# Patient Record
Sex: FEMALE
[De-identification: names, ages, dates, MRNs, and addresses within clinical notes are randomized per-mention and may not be internally consistent; named-entity substitution may affect disease eponyms.]

---

## 2019-09-18 ENCOUNTER — RESULT REVIEW (OUTPATIENT)
Age: 54
End: 2019-09-18

## 2020-10-19 ENCOUNTER — RESULT REVIEW (OUTPATIENT)
Age: 55
End: 2020-10-19

## 2021-06-29 ENCOUNTER — RESULT REVIEW (OUTPATIENT)
Age: 56
End: 2021-06-29

## 2021-10-25 ENCOUNTER — RESULT REVIEW (OUTPATIENT)
Age: 56
End: 2021-10-25

## 2022-12-07 PROBLEM — Z00.00 ENCOUNTER FOR PREVENTIVE HEALTH EXAMINATION: Status: ACTIVE | Noted: 2022-12-07

## 2022-12-13 ENCOUNTER — APPOINTMENT (OUTPATIENT)
Dept: HEMATOLOGY ONCOLOGY | Facility: CLINIC | Age: 57
End: 2022-12-13

## 2022-12-28 ENCOUNTER — OUTPATIENT (OUTPATIENT)
Dept: OUTPATIENT SERVICES | Facility: HOSPITAL | Age: 57
LOS: 1 days | Discharge: HOME | End: 2022-12-28

## 2022-12-28 DIAGNOSIS — D64.9 ANEMIA, UNSPECIFIED: ICD-10-CM

## 2023-01-04 ENCOUNTER — APPOINTMENT (OUTPATIENT)
Dept: HEMATOLOGY ONCOLOGY | Facility: CLINIC | Age: 58
End: 2023-01-04

## 2023-01-20 NOTE — DISCUSSION/SUMMARY
[FreeTextEntry1] : The visit was provided via telehealth using real-time 2-way audio visual technology. The patient, Rosie Melgar, [place] [address] at the time of the visit. The provider, Subha Mesa, was located at the medical office located in [place], [place] at the time of the visit. The patient, Rosie Melgar, and Provider, Subha Mesa, participated in the telehealth encounter. Consent for telehealth services was given on 2023 by the patient, Rosie Melgar.\par \par REASON FOR CONSULT\par Rosie Melgar is a 57-year-old female who was referred by Dr. Radha Lind for cancer genetic counseling and a discussion regarding the option for updated genetic testing as well as a discussion of her variant of uncertain significance (VUS) genetic testing results related to hereditary cancer predisposition. \par \par RELEVANT MEDICAL HISTORY\par Ms. Melgar is a healthy individual who has never had cancer. She has a family history of cancer. Relevant reported diagnoses include: her sister with breast cancer (dx early 50s), her mother with breast cancer (dx early 70s); Additional diagnoses as noted in pedigree, see below. \par \par Ms. Melgar previously pursued genetic testing of the BRCA1 and BRCA2 genes, and a variant of uncertain significance (VUS) was detected in the BRCA1 gene (c. 80+20T>C). This test was ordered by Yahaira Kwok and reported on 2015.\par \par OTHER MEDICAL AND SURGICAL HISTORY:\par Medical: back pain (possible nerve issue), uterine fibroids (approx. 3 total per patient)\par Surgical: partial hysterectomy early 40s (fibroids and painful cramps), foot surgery bunion x2, appendectomy in childhood\par \par PAST OB/GYN HISTORY:\par Obstetrical History: \par Age at Menarche: [age]\par Menopausal with LMP at age early 40s\par Age at First Live Birth: 29\par Oral Contraceptive Use: 8 years total\par Hormone Replacement Therapy: No\par \par CANCER SCREENING HISTORY:  \par Breast: \par •	Most recent mammo/sono: 2022; Results: reported monitoring cysts; Frequency: yearly\par •	No prior breast MRI\par •	Breast Biopsies: , bilateral, R-fibroadenoma, L-fibroadenoma\par GYN:\par •	Most recent GYN visit: ; Frequency: pre-pandemic \par •	Most recent pap smear: ; Results: reported normal\par •	No prior transvaginal ultrasound per pt\par Colon:\par •	Most recent colonoscopy: 10/2021; Results: reported 0 polyps; Frequency: 5 years\par •	Total Polyps: 1 total per pt\par Skin:  \par •	No recent skin exam, no lesions removed, saw dermatologist due to cosmetic indication at 55yo\par \par SOCIAL HISTORY:\par •	Tobacco-product use: Never smoker\par •	Environmental exposure: None\par \par FAMILY HISTORY:\par Maternal and paternal ancestry was reported as . No Ashkenazi Zoroastrian heritage reported. Consanguinity was denied. A detailed family history of cancer was ascertained, see below and scanned chart for pedigree. \par \par Of note, Ms. Melgar reported that her sister underwent genetic testing, however, she is not aware of the details of her sister’s results. This relative’s report was unavailable for our review at this time.\par 	\par 	RISK ASSESSMENT:\par Ms. Melgar’s family history of breast cancer in her mother (diagnosed early 70s) and sister (diagnosed early 50s) is not highly suggestive of a hereditary cancer predisposition syndrome. Ms. Melgar previously had genetic testing of the BRCA1 and BRCA2 genes, and NO known disease-causing mutations were identified. Of note, [facility] is currently classifying the previously identified BRCA1 VUS as likely benign in [facility]. \par \par The patient does not meet National Comprehensive Cancer Network (NCCN) criteria for testing. Ms. Melgar would like to pursue genetic testing as she concerned about her family history, and she understands there may be an out-of-pocket cost associated with testing. We discussed the option for updated genetic testing including a Breast Cancer Guidelines-Based Panel and one gene related to uterine fibroids. This test analyzes 12 genes: KHURRAM, BARD1, BRCA1, BRCA2, CDH1, CHEK2, FH, NF1, PALB2, PTEN, STK11, TP53.\par \par The risks, benefits and limitations of genetic testing were discussed with Ms. Melgar. In addition, we discussed the purpose of genetic testing and possible test results (positive, negative, inconclusive) along with associated medical management options and psychosocial implications. Insurance coverage and potential out of pocket costs were also discussed. The Genetic Information Non-discrimination Act (COLIN) was also reviewed, and Ms. Melgar had no concerns at this time.\par \par We also discussed that the laboratory we would utilize to conduct this genetic testing differs from the one previously used. These laboratories may classify mutations, and possibly her BRCA1 VUS, differently. Also, laboratories may utilize differing methods for analysis.\par \par Ms. Melgar verbally consented to the above mentioned genetic testing panel. [facility] informed consent form was emailed to the patient. A saliva sample kit was mailed to the patient today. Once the sample has been collected and sent out, Ms. Melgar will inform us. \par \par PLAN:\par \par 1.	Saliva kit was sent to Ms. Melgar’s home for collection. Once collected and dropped off, patient will inform us. \par 2.	Ms. Melgar was sent a copy of the [facility] consent and medical release form via email to be filled, signed, and returned to us.\par 3.	We will contact Ms. Melgar to schedule a follow-up appointment once the results are available. Results generally return in 2-3 weeks from the day the sample kit is mailed.\par \par \par For any additional questions please call Cancer Genetics at [phone]. \par \par \par Subha Mesa, MS\par Genetic Counselor, Cancer Genetics\par \par \par CC: Patient\par Dr. Radha Lind

## 2023-01-26 ENCOUNTER — APPOINTMENT (OUTPATIENT)
Dept: ORTHOPEDIC SURGERY | Facility: CLINIC | Age: 58
End: 2023-01-26

## 2023-01-31 ENCOUNTER — APPOINTMENT (OUTPATIENT)
Dept: PAIN MANAGEMENT | Facility: CLINIC | Age: 58
End: 2023-01-31
Payer: COMMERCIAL

## 2023-01-31 VITALS — HEIGHT: 65 IN | BODY MASS INDEX: 30.99 KG/M2 | WEIGHT: 186 LBS

## 2023-01-31 PROCEDURE — 99204 OFFICE O/P NEW MOD 45 MIN: CPT

## 2023-01-31 NOTE — PHYSICAL EXAM
[Normal Coordination] : normal coordination [Normal DTR UE/LE] : normal DTR UE/LE  [Normal Sensation] : normal sensation [Normal Mood and Affect] : normal mood and affect [Able to Communicate] : able to communicate [Normal Skin] : normal skin [No Rash] : no rash [No Ulcers] : no ulcers [No Lesions] : no lesions [No obvious lymphadenopathy in areas examined] : no obvious lymphadenopathy in areas examined [Well Developed] : well developed [Well Nourished] : well nourished [No Respiratory Distress] : no respiratory distress [Bending to left] : bending to left [] : motor exam is non-focal throughout both lower extremities

## 2023-01-31 NOTE — CONSULT LETTER
[Dear  ___] : Dear  [unfilled], [Consult Letter:] : I had the pleasure of evaluating your patient, [unfilled]. [Please see my note below.] : Please see my note below. [Consult Closing:] : Thank you very much for allowing me to participate in the care of this patient.  If you have any questions, please do not hesitate to contact me. [Sincerely,] : Sincerely, [FreeTextEntry3] : Tito Byers MD\par

## 2023-01-31 NOTE — DATA REVIEWED
[Outside X-rays] : outside x-rays [Cervical Spine] : cervical spine [Lumbar Spine] : lumbar spine [Report was reviewed and noted in the chart] : The report was reviewed and noted in the chart

## 2023-01-31 NOTE — HISTORY OF PRESENT ILLNESS
[Lower back] : lower back [Left Leg] : left leg [Dull/Aching] : dull/aching [] : yes [Frequent] : frequent [Household chores] : household chores [Social interactions] : social interactions [Rest] : rest [Standing] : standing [Bending forward] : bending forward [Stairs] : stairs [Meds] : meds [FreeTextEntry1] : Back pain with radiation to L hip/ LLE. Pain began early 2022 spring she feels triggered by lifiting heavy things. Denies bowel/bladder symptoms. She had prior episode with spasms in 2021 that resolved after several days of conservative treatment. No prior lumbar epidurals or surgery. Patient had Xrays done Dec 2022 but symptoms still persist despite conservative treatment directed by her PCP Dr KATHY Wade. Xrays reviewed and were deemed normal. No MRI to date will obtain. \par \par Pain is improved with tylenol. Walking improves it. Sitting makes the pain worse.  [de-identified] : lifting

## 2023-02-15 ENCOUNTER — NON-APPOINTMENT (OUTPATIENT)
Age: 58
End: 2023-02-15

## 2023-02-15 NOTE — DISCUSSION/SUMMARY
[FreeTextEntry1] : 02/15/2023\par \par Patient was contacted multiple times to complete her genetic testing by sending her saliva kit to the testing company. This was never completed. A letter was sent today by our [facility], Kishore Penny, requesting she contact our office to indicate whether she would still like to pursue genetic testing, otherwise the order will be cancelled in two weeks.\par \par Subha Mesa, MS\par Genetic Counselor

## 2023-02-21 ENCOUNTER — APPOINTMENT (OUTPATIENT)
Dept: PAIN MANAGEMENT | Facility: CLINIC | Age: 58
End: 2023-02-21

## 2023-02-28 ENCOUNTER — APPOINTMENT (OUTPATIENT)
Dept: PAIN MANAGEMENT | Facility: CLINIC | Age: 58
End: 2023-02-28
Payer: COMMERCIAL

## 2023-02-28 VITALS — HEIGHT: 65 IN | BODY MASS INDEX: 31.32 KG/M2 | WEIGHT: 188 LBS

## 2023-02-28 PROCEDURE — 99214 OFFICE O/P EST MOD 30 MIN: CPT

## 2023-02-28 NOTE — HISTORY OF PRESENT ILLNESS
[Lower back] : lower back [Left Leg] : left leg [Dull/Aching] : dull/aching [Frequent] : frequent [Household chores] : household chores [Social interactions] : social interactions [Rest] : rest [Meds] : meds [Standing] : standing [Bending forward] : bending forward [Stairs] : stairs [] : no [de-identified] : lifting

## 2023-02-28 NOTE — DISCUSSION/SUMMARY
[Surgical risks reviewed] : Surgical risks reviewed [de-identified] : After discussing various treatment options with the patient including but not limited to oral medications, physical therapy, exercise,\par modalities as well as interventional spinal injections, we have decided with the following plan\par \par I personally reviewed the MRI/CT scan images and agree with the radiologist's report. The\par radiological findings were discussed with the patient.\par \par \par The risks, benefits, contents and alternatives to injection were explained in full to the patient. Risks outlined include but are not\par limited to infection,sepsis, bleeding, post-dural puncture headache, nerve damage, temporary increase in pain, syncopal episode,\par failure to resolve symptoms, allergic reaction, symptom recurrence, and elevation of blood sugar in diabetics. Cortisone may cause\par immunosuppression. Patient understands the risks. All questions were answered. After discussion of options, patient requested\par an injection. Information regarding the injection was given to the patient. Which medications to stop prior to the injection was\par explained to the patient as well.\par \par Follow up in 1-2 weeks post injection for re-evaluation.\par \par  Conservative Care\par Continue Home exercises, stretching, activity modification, physical therapy, and conservative care.\par \par Proceed with L 3/4 4/5 TFESI\par \par Sacralized L5 count from 12th rib\par

## 2023-03-01 ENCOUNTER — NON-APPOINTMENT (OUTPATIENT)
Age: 58
End: 2023-03-01

## 2023-03-01 NOTE — DISCUSSION/SUMMARY
[FreeTextEntry1] : Patient did not respond to letter regarding submission of saliva sample for genetic testing ordered in 1/2023. Genetic testing order was cancelled on 3/1/2023.\par  \par Ms. Melgar may pursue testing at any time in the future and we remain available to them should they wish to make another appointment.\par  \par Subha Mesa, MS, [facility]\par Genetic Counselor, Cancer Genetics

## 2023-03-14 ENCOUNTER — APPOINTMENT (OUTPATIENT)
Dept: PAIN MANAGEMENT | Facility: CLINIC | Age: 58
End: 2023-03-14
Payer: COMMERCIAL

## 2023-03-14 PROCEDURE — 64483 NJX AA&/STRD TFRM EPI L/S 1: CPT | Mod: LT

## 2023-03-14 PROCEDURE — 64484 NJX AA&/STRD TFRM EPI L/S EA: CPT | Mod: 59,LT

## 2023-03-14 NOTE — PROCEDURE
[FreeTextEntry3] : Date of Service: 03/14/2023 \par \par Account: [id number] \par \par Patient: MARGY OSCAR \par \par YOB: 1965 \par \par Age: 57 year \par \par \par \par Surgeon:                                              Tito Byers M.D.\par \par Assistant:                                             None.\par \par Pre-Operative Diagnosis:                 Lumbosacral Radiculitis (M54.17)                 \par \par Post Operative Diagnosis:               Lumbosacral Radiculitis (M54.17)     \par \par Procedure:                                          Left L3-4, L4-5 transforaminal epidural steroid injection under fluoroscopic guidance.\par \par Anesthesia:                                         MAC\par \par \par This procedure was carried out using fluoroscopic guidance.  The risks and benefits of the procedure were discussed extensively with the patient.  The consent of the patient was obtained and the following procedure was performed. The patient was placed in the prone position on the fluoroscopic table and the lumbar area was prepped and draped in a sterile fashion.\par \par The left L3-4 neural foramen was identified on right oblique  "yunior dog" anatomical view at the 6 o' clock position using fluoroscopic guidance, and the area was marked. The overlying skin and subcutaneous structures were anesthetized using sterile technique with 1% Lidocaine.  A 22 gauge spinal needle was directed toward the inferior (6 o'clock) position of the pedicle, which formed the roof of the identified foramen.  Once in the epidural space, after negative aspiration for heme and CSF, 1cc of Omnipaque contrast was injected to confirm epidural location and assess filling defects and rule out intravascular needle placement. \par \par The following contrast flow observed: no intravascular or intrathecal flow pattern was noted.  No blood or CSF was aspirated. Omnipaque spread medially in epidural space.  \par \par After this, an injectate of 3 cc preservative free normal saline plus 40 mg of depo- Medrol was injected in the epidural space.\par \par The left L4-5 neural foramen was identified on right oblique  "yunior dog" anatomical view at the 6 o' clock position using fluoroscopic guidance, and the area was marked. The overlying skin and subcutaneous structures were anesthetized using sterile technique with 1% Lidocaine.  A 22 gauge spinal needle was directed toward the inferior (6 o'clock) position of the pedicle, which formed the roof of the identified foramen.  Once in the epidural space, after negative aspiration for heme and CSF, 1cc of Omnipaque contrast was injected to confirm epidural location and assess filling defects and rule out intravascular needle placement. \par \par The following contrast flow observed: no intravascular or intrathecal flow pattern was noted.  No blood or CSF was aspirated. Omnipaque spread medially in epidural space.  \par \par After this, an injectate of 3 cc preservative free normal saline plus 40mg of depo- medrol was injected in the epidural space.\par \par The needle was subsequently removed.  Vital signs remained normal.  Pulse oximeter was used throughout the procedure and the patient's pulse and oxygen saturation remained within normal limits.  The patient tolerated the procedure well.  There were no complications.  The patient was instructed to apply ice over the injection sites for twenty minutes every two hours for the next 24 to 48 hours.  The patient was also instructed to contact me immediately if there were any problems.\par \par Tito Byers M.D.\par

## 2023-03-28 ENCOUNTER — APPOINTMENT (OUTPATIENT)
Dept: PAIN MANAGEMENT | Facility: CLINIC | Age: 58
End: 2023-03-28
Payer: COMMERCIAL

## 2023-03-28 VITALS — HEIGHT: 65 IN | BODY MASS INDEX: 31.32 KG/M2 | WEIGHT: 188 LBS

## 2023-03-28 DIAGNOSIS — M54.50 LOW BACK PAIN, UNSPECIFIED: ICD-10-CM

## 2023-03-28 DIAGNOSIS — M54.17 RADICULOPATHY, LUMBOSACRAL REGION: ICD-10-CM

## 2023-03-28 DIAGNOSIS — G89.29 LOW BACK PAIN, UNSPECIFIED: ICD-10-CM

## 2023-03-28 PROCEDURE — 99213 OFFICE O/P EST LOW 20 MIN: CPT

## 2023-03-28 NOTE — ASSESSMENT
[FreeTextEntry1] : s/p L 3/4 4/5 TFESI\par \par Pt states her leg pain has resolved. She is better able to clean which used to trigger  her pain and cause her the need to sit down. \par \par OVerall 80% relief, will followup prn and continue HEP

## 2023-03-28 NOTE — HISTORY OF PRESENT ILLNESS
[Lower back] : lower back [Dull/Aching] : dull/aching [Occasional] : occasional [Rest] : rest [Meds] : meds [Injection therapy] : injection therapy [] : no [FreeTextEntry8] : getting out of bed  [de-identified] : getting out of bed